# Patient Record
Sex: FEMALE | Race: OTHER
[De-identification: names, ages, dates, MRNs, and addresses within clinical notes are randomized per-mention and may not be internally consistent; named-entity substitution may affect disease eponyms.]

---

## 2018-05-25 ENCOUNTER — HOSPITAL ENCOUNTER (EMERGENCY)
Dept: HOSPITAL 72 - EMR | Age: 48
Discharge: HOME | End: 2018-05-25
Payer: MEDICAID

## 2018-05-25 VITALS — SYSTOLIC BLOOD PRESSURE: 117 MMHG | DIASTOLIC BLOOD PRESSURE: 84 MMHG

## 2018-05-25 VITALS — DIASTOLIC BLOOD PRESSURE: 78 MMHG | SYSTOLIC BLOOD PRESSURE: 124 MMHG

## 2018-05-25 VITALS — BODY MASS INDEX: 40.97 KG/M2 | WEIGHT: 240 LBS | HEIGHT: 64 IN

## 2018-05-25 DIAGNOSIS — K44.9: ICD-10-CM

## 2018-05-25 DIAGNOSIS — F45.8: Primary | ICD-10-CM

## 2018-05-25 LAB
ADD MANUAL DIFF: NO
ALBUMIN SERPL-MCNC: 4 G/DL (ref 3.4–5)
ALBUMIN/GLOB SERPL: 1.1 {RATIO} (ref 1–2.7)
ALP SERPL-CCNC: 82 U/L (ref 46–116)
ALT SERPL-CCNC: 25 U/L (ref 12–78)
ANION GAP SERPL CALC-SCNC: 7 MMOL/L (ref 5–15)
APPEARANCE UR: (no result)
APTT PPP: (no result) S
AST SERPL-CCNC: 15 U/L (ref 15–37)
BASOPHILS NFR BLD AUTO: 0.9 % (ref 0–2)
BILIRUB SERPL-MCNC: 0.4 MG/DL (ref 0.2–1)
BUN SERPL-MCNC: 9 MG/DL (ref 7–18)
CALCIUM SERPL-MCNC: 9.2 MG/DL (ref 8.5–10.1)
CHLORIDE SERPL-SCNC: 105 MMOL/L (ref 98–107)
CO2 SERPL-SCNC: 28 MMOL/L (ref 21–32)
CREAT SERPL-MCNC: 0.7 MG/DL (ref 0.55–1.3)
EOSINOPHIL NFR BLD AUTO: 1.2 % (ref 0–3)
ERYTHROCYTE [DISTWIDTH] IN BLOOD BY AUTOMATED COUNT: 11.2 % (ref 11.6–14.8)
GLOBULIN SER-MCNC: 3.8 G/DL
GLUCOSE UR STRIP-MCNC: NEGATIVE MG/DL
HCT VFR BLD CALC: 42.2 % (ref 37–47)
HGB BLD-MCNC: 14 G/DL (ref 12–16)
KETONES UR QL STRIP: NEGATIVE
LEUKOCYTE ESTERASE UR QL STRIP: NEGATIVE
LYMPHOCYTES NFR BLD AUTO: 23.6 % (ref 20–45)
MCV RBC AUTO: 86 FL (ref 80–99)
MONOCYTES NFR BLD AUTO: 6.4 % (ref 1–10)
NEUTROPHILS NFR BLD AUTO: 67.9 % (ref 45–75)
NITRITE UR QL STRIP: NEGATIVE
PH UR STRIP: 6 [PH] (ref 4.5–8)
PLATELET # BLD: 283 K/UL (ref 150–450)
POTASSIUM SERPL-SCNC: 4 MMOL/L (ref 3.5–5.1)
PROT UR QL STRIP: NEGATIVE
RBC # BLD AUTO: 4.9 M/UL (ref 4.2–5.4)
SODIUM SERPL-SCNC: 140 MMOL/L (ref 136–145)
SP GR UR STRIP: 1 (ref 1–1.03)
UROBILINOGEN UR-MCNC: NORMAL MG/DL (ref 0–1)
WBC # BLD AUTO: 8.8 K/UL (ref 4.8–10.8)

## 2018-05-25 PROCEDURE — 74220 X-RAY XM ESOPHAGUS 1CNTRST: CPT

## 2018-05-25 PROCEDURE — 70360 X-RAY EXAM OF NECK: CPT

## 2018-05-25 PROCEDURE — 99284 EMERGENCY DEPT VISIT MOD MDM: CPT

## 2018-05-25 PROCEDURE — 96375 TX/PRO/DX INJ NEW DRUG ADDON: CPT

## 2018-05-25 PROCEDURE — 93005 ELECTROCARDIOGRAM TRACING: CPT

## 2018-05-25 PROCEDURE — 83690 ASSAY OF LIPASE: CPT

## 2018-05-25 PROCEDURE — 85025 COMPLETE CBC W/AUTO DIFF WBC: CPT

## 2018-05-25 PROCEDURE — 96374 THER/PROPH/DIAG INJ IV PUSH: CPT

## 2018-05-25 PROCEDURE — 36415 COLL VENOUS BLD VENIPUNCTURE: CPT

## 2018-05-25 PROCEDURE — 80053 COMPREHEN METABOLIC PANEL: CPT

## 2018-05-25 PROCEDURE — 81003 URINALYSIS AUTO W/O SCOPE: CPT

## 2018-05-25 PROCEDURE — 70491 CT SOFT TISSUE NECK W/DYE: CPT

## 2018-05-25 NOTE — DIAGNOSTIC IMAGING REPORT
Indication: Dysphagia, pain, sensation of something being stuck in her neck after

eating meat last night

 

Technique: IV administration nonionic contrast. Spiral acquisitions obtained through

the neck. Multiplanar reconstructions were generated. Total dose length product

558.89 mGycm. CTDIvol(s) 18.96 mGy. Dose reduction achieved using automated exposure

control

 

 

Comparison: none

 

Findings: The upper aerodigestive tract is unremarkable. No definite evidence of

foreign body within the upper thoracic and cervical esophagus. No proximal esophageal

distention demonstrated.

 

There are prominent anterior triangle nodes, all with jugulodigastric nodes measuring

up to 21 mm long axis dimension on the right and 19 mm on the left, but no gerber

cervical mass or adenopathy demonstrated. No osseous abnormality. There is a mucous

retention cyst in the right maxillary sinus. Included thyroid is unremarkable. The

salivary glands are unremarkable. Included lung apices are clear. Included

intracranial structures are unremarkable.

 

Impression: No CT evidence of impacted esophageal foreign body or other findings to

explain stated clinical history of dysphagia. However, isoattenuating esophageal

foreign body cannot be completely excluded

 

No other evidence of acute or significant abnormality demonstrated

 

The CT scanner at [facility] is accredited by the American College of

Radiology and the scans are performed using protocols designed to limit radiation

exposure to as low as reasonably achievable to attain images of sufficient resolution

adequate for diagnostic evaluation.

## 2018-05-25 NOTE — EMERGENCY ROOM REPORT
History of Present Illness


General


Chief Complaint:  General Complaint


Source:  Patient


 (YANIQUEKYLIE MUNOZ D.O.)





Present Illness


HPI


This patient states that last night after eating a piece of meat she has had a 

sensation of something being stuck in her neck.  She states that it feels that 

it is stuck right about the junction of her neck and chest.  She denies 

difficulty with her secretions.  She did have 2 episodes of vomiting.  She is 

on Septra and drops for an otitis externa that her primary care physician 

prescribed her.  She denies fever or chills.  She denies chest pain or 

shortness of breath.  She denies abdominal pain.  She has no other complaints.


 (SARAMALACHIKYLIE MUNOZ D.O.)


Allergies:  


Coded Allergies:  


     No Known Allergies (Unverified , 9/14/16)





Patient History


Past Medical History:  none, see triage record


Social History:  Denies: smoking, alcohol use, drug use


Last Menstrual Period:  05/01/18


Reviewed Nursing Documentation:  PMH: Agreed; PSxH: Agreed (KYLIE CHANEL D.O.

)





Nursing Documentation-PMH


Past Medical History:  No Stated History


 (KYLIE CHANEL D.O.)





Review of Systems


All Other Systems:  negative except mentioned in HPI


 (KYLIE CHANEL D.O.)





Physical Exam





Vital Signs








  Date Time  Temp Pulse Resp B/P (MAP) Pulse Ox O2 Delivery O2 Flow Rate FiO2


 


5/25/18 09:22 98.1 85 18 117/84 94 Room Air  





 98.1       








Sp02 EP Interpretation:  reviewed, normal


General Appearance:  no apparent distress, alert, GCS 15, non-toxic


Head:  normocephalic, atraumatic


Eyes:  bilateral eye normal inspection, bilateral eye PERRL


ENT:  hearing grossly normal, normal pharynx, no angioedema, normal voice


Neck:  full range of motion, supple/symm/no masses


Respiratory:  chest non-tender, lungs clear, normal breath sounds, no 

respiratory distress, no retraction, no accessory muscle use, speaking full 

sentences


Cardiovascular #1:  regular rate, rhythm, no edema


Gastrointestinal:  normal bowel sounds, non tender, soft, non-distended, no 

guarding, no rebound


Rectal:  deferred


Musculoskeletal:  back normal, gait/station normal, normal range of motion, non-

tender


Neurologic:  alert, oriented x3, responsive, motor strength/tone normal, 

sensory intact, speech normal


Psychiatric:  judgement/insight normal, memory normal, mood/affect normal, no 

suicidal/homicidal ideation


Skin:  normal color, no rash, warm/dry, well hydrated


 (KYLIE CHANEL D.O.)





Medical Decision Making


Diagnostic Impression:  


 Primary Impression:  


 Globus sensation


 Additional Impression:  


 Possible esophageal food impaction


ER Course


Patient has a globus sensation after eating meat yesterday.  She did have 2 

episodes of vomiting.  Possibly this is some esophageal irritation versus an 

esophageal food bolus.  Another consideration is a pill esophagitis.  Soft 

tissue x-ray of the neck was negative.  I did give the patient IV glucagon and 

the patient tried drinking carbonated beverages without any relief in symptoms.

  The patient was maintaining her secretions and was without distress.  Given 

the persistent symptoms, I did obtain a CT of the neck which showed....


I felt this patient should be admitted for further investigation for an 

esophageal food impaction by gastroenterology.





Laboratory Tests








Test


  5/25/18


10:21 5/25/18


10:28


 


Urine Color Pale yellow   


 


Urine Appearance Turbid   


 


Urine pH 6 (4.5-8.0)   


 


Urine Specific Gravity


  1.005


(1.005-1.035) 


 


 


Urine Protein


  Negative


(NEGATIVE) 


 


 


Urine Glucose (UA)


  Negative


(NEGATIVE) 


 


 


Urine Ketones


  Negative


(NEGATIVE) 


 


 


Urine Occult Blood


  1+ (NEGATIVE)


H 


 


 


Urine Nitrite


  Negative


(NEGATIVE) 


 


 


Urine Bilirubin


  Negative


(NEGATIVE) 


 


 


Urine Urobilinogen


  Normal MG/DL


(0.0-1.0) 


 


 


Urine Leukocyte Esterase


  Negative


(NEGATIVE) 


 


 


Urine RBC


  0-2 /HPF (0 -


2) 


 


 


Urine WBC


  0-2 /HPF (0 -


2) 


 


 


Urine Squamous Epithelial


Cells Moderate /LPF


(NONE/OCC)  H 


 


 


Urine Bacteria


  Few /HPF


(NONE) 


 


 


White Blood Count


  


  8.8 K/UL


(4.8-10.8)


 


Red Blood Count


  


  4.90 M/UL


(4.20-5.40)


 


Hemoglobin


  


  14.0 G/DL


(12.0-16.0)


 


Hematocrit


  


  42.2 %


(37.0-47.0)


 


Mean Corpuscular Volume  86 FL (80-99)  


 


Mean Corpuscular Hemoglobin


  


  28.6 PG


(27.0-31.0)


 


Mean Corpuscular Hemoglobin


Concent 


  33.3 G/DL


(32.0-36.0)


 


Red Cell Distribution Width


  


  11.2 %


(11.6-14.8)  L


 


Platelet Count


  


  283 K/UL


(150-450)


 


Mean Platelet Volume


  


  5.5 FL


(6.5-10.1)  L


 


Neutrophils (%) (Auto)


  


  67.9 %


(45.0-75.0)


 


Lymphocytes (%) (Auto)


  


  23.6 %


(20.0-45.0)


 


Monocytes (%) (Auto)


  


  6.4 %


(1.0-10.0)


 


Eosinophils (%) (Auto)


  


  1.2 %


(0.0-3.0)


 


Basophils (%) (Auto)


  


  0.9 %


(0.0-2.0)


 


Sodium Level


  


  140 MMOL/L


(136-145)


 


Potassium Level


  


  4.0 MMOL/L


(3.5-5.1)


 


Chloride Level


  


  105 MMOL/L


()


 


Carbon Dioxide Level


  


  28 MMOL/L


(21-32)


 


Anion Gap


  


  7 mmol/L


(5-15)


 


Blood Urea Nitrogen


  


  9 mg/dL (7-18)


 


 


Creatinine


  


  0.7 MG/DL


(0.55-1.30)


 


Estimate Glomerular


Filtration Rate 


  > 60 mL/min


(>60)


 


Glucose Level


  


  114 MG/DL


()  H


 


Calcium Level


  


  9.2 MG/DL


(8.5-10.1)


 


Total Bilirubin


  


  0.4 MG/DL


(0.2-1.0)


 


Aspartate Amino Transferase


(AST) 


  15 U/L (15-37)


 


 


Alanine Aminotransferase (ALT)


  


  25 U/L (12-78)


 


 


Alkaline Phosphatase


  


  82 U/L


()


 


Total Protein


  


  7.8 G/DL


(6.4-8.2)


 


Albumin


  


  4.0 G/DL


(3.4-5.0)


 


Globulin  3.8 g/dL  


 


Albumin/Globulin Ratio  1.1 (1.0-2.7)  


 


Lipase


  


  65 U/L


()  L








 (KYLIE CHANEL D.O.)


ER Course


Patient was discussed with Dr. Garcia who agreed to see the patient.  The 

patient is discussed with Dr. Jesse Peña due to capitated physician.The CT 

imaging was previously performed and showed no evidence of esophageal foreign 

body.  Patient was discussed with Dr. Garcia who requested an esophagram.  

The esophagram read by radiology showed no evidence of foreign body or 

obstruction    The patient was noted to have normal ability to swallow and 

shows no evidence of airway compromise.  Patient was given lidocaine and Maalox 

with improvement after discussion with Dr. Garcia.  The patient subsequently 

was offered transfer for further evaluation with ENT.  The patient declined 

transfer and stated she wanted to go home and would follow-up if symptoms 

worsened





Labs








Test


  5/25/18


10:21 5/25/18


10:28


 


Urine Color Pale yellow  


 


Urine Appearance Turbid  


 


Urine pH 6 (4.5-8.0)  


 


Urine Specific Gravity


  1.005


(1.005-1.035) 


 


 


Urine Protein


  Negative


(NEGATIVE) 


 


 


Urine Glucose (UA)


  Negative


(NEGATIVE) 


 


 


Urine Ketones


  Negative


(NEGATIVE) 


 


 


Urine Occult Blood 1+ (NEGATIVE)  


 


Urine Nitrite


  Negative


(NEGATIVE) 


 


 


Urine Bilirubin


  Negative


(NEGATIVE) 


 


 


Urine Urobilinogen


  Normal MG/DL


(0.0-1.0) 


 


 


Urine Leukocyte Esterase


  Negative


(NEGATIVE) 


 


 


Urine RBC


  0-2 /HPF (0 -


2) 


 


 


Urine WBC


  0-2 /HPF (0 -


2) 


 


 


Urine Squamous Epithelial


Cells Moderate /LPF


(NONE/OCC) 


 


 


Urine Bacteria


  Few /HPF


(NONE) 


 


 


White Blood Count


  


  8.8 K/UL


(4.8-10.8)


 


Red Blood Count


  


  4.90 M/UL


(4.20-5.40)


 


Hemoglobin


  


  14.0 G/DL


(12.0-16.0)


 


Hematocrit


  


  42.2 %


(37.0-47.0)


 


Mean Corpuscular Volume  86 FL (80-99) 


 


Mean Corpuscular Hemoglobin


  


  28.6 PG


(27.0-31.0)


 


Mean Corpuscular Hemoglobin


Concent 


  33.3 G/DL


(32.0-36.0)


 


Red Cell Distribution Width


  


  11.2 %


(11.6-14.8)


 


Platelet Count


  


  283 K/UL


(150-450)


 


Mean Platelet Volume


  


  5.5 FL


(6.5-10.1)


 


Neutrophils (%) (Auto)


  


  67.9 %


(45.0-75.0)


 


Lymphocytes (%) (Auto)


  


  23.6 %


(20.0-45.0)


 


Monocytes (%) (Auto)


  


  6.4 %


(1.0-10.0)


 


Eosinophils (%) (Auto)


  


  1.2 %


(0.0-3.0)


 


Basophils (%) (Auto)


  


  0.9 %


(0.0-2.0)


 


Sodium Level


  


  140 MMOL/L


(136-145)


 


Potassium Level


  


  4.0 MMOL/L


(3.5-5.1)


 


Chloride Level


  


  105 MMOL/L


()


 


Carbon Dioxide Level


  


  28 MMOL/L


(21-32)


 


Anion Gap


  


  7 mmol/L


(5-15)


 


Blood Urea Nitrogen  9 mg/dL (7-18) 


 


Creatinine


  


  0.7 MG/DL


(0.55-1.30)


 


Estimat Glomerular Filtration


Rate 


  > 60 mL/min


(>60)


 


Glucose Level


  


  114 MG/DL


()


 


Calcium Level


  


  9.2 MG/DL


(8.5-10.1)


 


Total Bilirubin


  


  0.4 MG/DL


(0.2-1.0)


 


Aspartate Amino Transf


(AST/SGOT) 


  15 U/L (15-37) 


 


 


Alanine Aminotransferase


(ALT/SGPT) 


  25 U/L (12-78) 


 


 


Alkaline Phosphatase


  


  82 U/L


()


 


Total Protein


  


  7.8 G/DL


(6.4-8.2)


 


Albumin


  


  4.0 G/DL


(3.4-5.0)


 


Globulin  3.8 g/dL 


 


Albumin/Globulin Ratio  1.1 (1.0-2.7) 


 


Lipase


  


  65 U/L


()








 (Kelvin Downs MD)





Last Vital Signs








  Date Time  Temp Pulse Resp B/P (MAP) Pulse Ox O2 Delivery O2 Flow Rate FiO2


 


5/25/18 09:35 98.1 82 18 117/84 94 Room Air  





 98.1       








 (KYLIE CHANEL D.O.)


Status:  improved


 (Kelvin Downs MD)


Disposition:  HOME, SELF-CARE


Condition:  Stable


Scripts


Omeprazole Magnesium (PRILOSEC OTC) 20 Mg Tablet.dr


20 MG ORAL DAILY, #30 TAB


   Prov: Kelvin Downs MD         5/25/18 


Lidocaine HCl (Lidocaine HCl Viscous) 100 Ml Solution


20 MG MM EVERY 4 HOURS, #120 MG


   Prov: Kelvin Downs MD         5/25/18


Referrals:  


NON PHYSICIAN (PCP)











KYLIE CHANEL D.O. May 25, 2018 13:29


Kelvin Downs MD May 25, 2018 16:07

## 2018-05-25 NOTE — DIAGNOSTIC IMAGING REPORT
Indication: Sore throat times one day after eating meat, suspected esophageal foreign

body

 

Technique: The patient ingested effervescent granules, oral thick and thin liquid

barium, and rapid sequence spot images were obtained in multiple projections.

 

Total fluoroscopy time 0.7 minutes.

Total dose area product  320 dGycm2

 

Comparison: none

 

Findings: On the lateral view, the epiglottis appears somewhat thick, although this

is only the case in the neutral position.. No intraluminal filling defects

demonstrated. No obstruction to downward propulsion of contrast. There is minimal

distal dysmotility. On the prone images, there is a small sliding-type hiatal hernia

 

Impression: No evidence of esophageal foreign body or esophageal impaction or

obstruction

 

Equivocal mild thickening of the epiglottis, significance uncertain if real

 

Mild distal esophageal dysmotility

 

Small siding type hiatal hernia

## 2018-05-25 NOTE — DIAGNOSTIC IMAGING REPORT
Indication: Reason For Exam: PAIN

 

Technique: 2 views of the neck with soft tissue technique

 

Comparison: none

 

Findings: The bones are unremarkable. No prevertebral soft tissue swelling. No

radiopaque foreign body demonstrated. No epiglottic enlargement. No glottic narrowing

 

Impression: Negative I have reviewed and confirmed nurses' notes for patient's medications, allergies, medical history, and surgical history.

## 2018-05-26 NOTE — CONSULTATION
DATE OF CONSULTATION:  2018



GASTROENTEROLOGY CONSULTATION



CONSULTING PHYSICIAN:  Monae Garcia M.D.



CHIEF COMPLAINT:  I was asked to see this patient by Dr. Jesse Peña for

evaluation of her throat symptoms.



HISTORY OF PRESENT ILLNESS:  The patient is a 47-year-old [place] woman,

whom I was asked to see on an emergency basis due to her emergency

complaints in the emergency room.  The patient was at a party last night

and had some type of a meat, which got stuck in her throat for

approximately 20 minutes.  She had some hiccups and then eventually was

able to pass the meat down.  However since then, she has had a sensation

of foreign body or dysphagia in the neck area.  She tried eating multiple

foods such as water and lemon juice and even a hard piece of bread to make

the perceived foreign body to pass through.  However, she continued to

have the symptoms.  She came to the emergency room for further evaluation.

This is the first time she has had this problem.  She has had no nausea,

vomiting, and no history of gastroesophageal reflux.  She has had a recent

ear infection, for which she took some Bactrim for about three days twice

daily.  The patient does not smoke.



PAST MEDICAL HISTORY:  Otherwise negative.



PAST SURGICAL HISTORY:  Status post  section.



ALLERGIES:  None.



FAMILY HISTORY:  Noncontributory.



SOCIAL HISTORY:  She does not smoke or drink alcohol.



REVIEW OF SYSTEMS:  Otherwise negative.



PHYSICAL EXAMINATION:

GENERAL:  Obese [place] woman, seen in the emergency room.

HEENT:  Normocephalic and atraumatic.  Sclerae are anicteric.  Oropharynx

clear.

NECK:  Supple.  There is no lymphadenopathy.

CHEST:  Clear to auscultation.

CARDIOVASCULAR:  Regular rate.

ABDOMEN:  Soft.

EXTREMITIES:  Revealed no edema.



DIAGNOSTIC DATA:  CT scan and esophagram were noted.



ASSESSMENT:  This patient has a sensation of foreign body like sensation in

her throat since she had a meat impaction last night.  It is likely that

the meat impaction caused some injury to the laryngeal area and in fact

the epiglottis according to the imaging studies looks possibly somewhat

inflamed.  As such, these relatively minor injuries to the area can

continue to cause a foreign body sensation until they heal.  There is no

evidence of obstruction on either neck CT or esophagram and in fact the

patient can eat and drink.  However, the persistent sensation of dysphagia

bothers her.  I advised that she should wait a few days and if the process

does not heal by itself, then either a laryngoscopy or endoscopy or both

can be done to evaluate both the upper laryngeal area and the upper

esophageal area.  An ENT consultation will be worthwhile in this regard.

For the time being, however, it appears that a period of waiting for

recovery will be appropriate.  This can be done as an inpatient if the

patient is unable to hydrate herself or as an outpatient if the patient is

able to drink adequate liquids to maintain hydration.  Dose of viscous

lidocaine mixed with Mylanta may help with her swallow.



RECOMMENDATIONS:  Per above discussion and per discussion with emergency

room physician.



Thank you for asking me to participate in the care of this patient.









  ______________________________________________

  Monae Garcia M.D.





DR:  CELSO

D:  2018 17:47

T:  2018 00:05

JOB#:  [id number]

CC:



DYLAN

## 2018-05-28 NOTE — CARDIOLOGY REPORT
--------------- APPROVED REPORT --------------





EKG Measurement

Heart [id number]

AZ [id number]

[id number]

[id number]

[id number]





Normal sinus rhythm

Low voltage QRS

Borderline ECG

## 2018-05-29 NOTE — CONSULTATION
Consult Note


Consult Note


HISTORY AND PHYSICAL





HISTORY OF PRESENT ILLNESS:  The patient is a 47-year-old [place] woman,


whom I was asked to see on an emergency basis due to her emergency


complaints in the emergency room.  The patient was at a party last night


and had some type of a meat, which got stuck in her throat for


approximately 20 minutes.  She had some hiccups and then eventually was


able to pass the meat down.  However since then, she has had a sensation


of foreign body or dysphagia in the neck area.  She tried eating multiple


foods such as water and lemon juice and even a hard piece of bread to make


the perceived foreign body to pass through.  However, she continued to


have the symptoms.  She came to the emergency room for further evaluation.


This is the first time she has had this problem.  She has had no nausea,


vomiting, and no history of gastroesophageal reflux.  She has had a recent


ear infection, for which she took some Bactrim for about three days twice


daily.  The patient does not smoke.





PAST MEDICAL HISTORY:  Otherwise negative.





PAST SURGICAL HISTORY:  Status post  section.





ALLERGIES:  None.





FAMILY HISTORY:  Noncontributory.





SOCIAL HISTORY:  She does not smoke or drink alcohol.





REVIEW OF SYSTEMS:  Otherwise negative.





PHYSICAL EXAMINATION:


GENERAL:  Obese [place] woman, seen in the emergency room.


HEENT:  Normocephalic and atraumatic.  Sclerae are anicteric.  Oropharynx


clear.


NECK:  Supple.  There is no lymphadenopathy.


CHEST:  Clear to auscultation.


CARDIOVASCULAR:  Regular rate.


ABDOMEN:  Soft.


EXTREMITIES:  Revealed no edema.





DIAGNOSTIC DATA:  CT scan and esophagram were noted.





ASSESSMENT:  This patient has a sensation of foreign body like sensation in


her throat since she had a meat impaction last night.  It is likely that


the meat impaction caused some injury to the laryngeal area and in fact


the epiglottis according to the imaging studies looks possibly somewhat


inflamed.  As such, these relatively minor injuries to the area can


continue to cause a foreign body sensation until they heal.  There is no


evidence of obstruction on either neck CT or esophagram and in fact the


patient can eat and drink.  However, the persistent sensation of dysphagia


bothers her.  





An ENT consultation will be worthwhile in this regard.


For the time being, however, it appears that a period of waiting for


recovery will be appropriate.  This can be done as an inpatient if the


patient is unable to hydrate herself or as an outpatient if the patient is


able to drink adequate liquids to maintain hydration.  Dose of viscous


lidocaine mixed with Mylanta may help with her swallow.





Discussed with ER physician and GI specialist.  Likely discharge home versus 

transfer to contracted hospital.








  ______________________________________________


Jesse Peña M.D.











Jesse Peña MD May 29, 2018 16:04

## 2018-06-02 ENCOUNTER — HOSPITAL ENCOUNTER (EMERGENCY)
Dept: HOSPITAL 72 - EMR | Age: 48
Discharge: HOME | End: 2018-06-02
Payer: MEDICAID

## 2018-06-02 VITALS — DIASTOLIC BLOOD PRESSURE: 61 MMHG | SYSTOLIC BLOOD PRESSURE: 112 MMHG

## 2018-06-02 VITALS — BODY MASS INDEX: 40.97 KG/M2 | HEIGHT: 64 IN | WEIGHT: 240 LBS

## 2018-06-02 VITALS — SYSTOLIC BLOOD PRESSURE: 112 MMHG | DIASTOLIC BLOOD PRESSURE: 61 MMHG

## 2018-06-02 DIAGNOSIS — K52.9: Primary | ICD-10-CM

## 2018-06-02 LAB
ADD MANUAL DIFF: NO
ALBUMIN SERPL-MCNC: 3.9 G/DL (ref 3.4–5)
ALBUMIN/GLOB SERPL: 0.9 {RATIO} (ref 1–2.7)
ALP SERPL-CCNC: 78 U/L (ref 46–116)
ALT SERPL-CCNC: 32 U/L (ref 12–78)
ANION GAP SERPL CALC-SCNC: 11 MMOL/L (ref 5–15)
APPEARANCE UR: CLEAR
APTT PPP: (no result) S
AST SERPL-CCNC: 28 U/L (ref 15–37)
BASOPHILS NFR BLD AUTO: 0.8 % (ref 0–2)
BILIRUB SERPL-MCNC: 0.5 MG/DL (ref 0.2–1)
BUN SERPL-MCNC: 19 MG/DL (ref 7–18)
CALCIUM SERPL-MCNC: 8.8 MG/DL (ref 8.5–10.1)
CHLORIDE SERPL-SCNC: 105 MMOL/L (ref 98–107)
CO2 SERPL-SCNC: 24 MMOL/L (ref 21–32)
CREAT SERPL-MCNC: 0.7 MG/DL (ref 0.55–1.3)
EOSINOPHIL NFR BLD AUTO: 0.6 % (ref 0–3)
ERYTHROCYTE [DISTWIDTH] IN BLOOD BY AUTOMATED COUNT: 11.3 % (ref 11.6–14.8)
GLOBULIN SER-MCNC: 4.2 G/DL
GLUCOSE UR STRIP-MCNC: NEGATIVE MG/DL
HCT VFR BLD CALC: 44.5 % (ref 37–47)
HGB BLD-MCNC: 15.6 G/DL (ref 12–16)
KETONES UR QL STRIP: NEGATIVE
LEUKOCYTE ESTERASE UR QL STRIP: (no result)
LYMPHOCYTES NFR BLD AUTO: 10.9 % (ref 20–45)
MCV RBC AUTO: 85 FL (ref 80–99)
MONOCYTES NFR BLD AUTO: 5.9 % (ref 1–10)
NEUTROPHILS NFR BLD AUTO: 81.9 % (ref 45–75)
NITRITE UR QL STRIP: NEGATIVE
PH UR STRIP: 7 [PH] (ref 4.5–8)
PLATELET # BLD: 276 K/UL (ref 150–450)
POTASSIUM SERPL-SCNC: 4.5 MMOL/L (ref 3.5–5.1)
PROT UR QL STRIP: NEGATIVE
RBC # BLD AUTO: 5.26 M/UL (ref 4.2–5.4)
SODIUM SERPL-SCNC: 139 MMOL/L (ref 136–145)
SP GR UR STRIP: 1.01 (ref 1–1.03)
UROBILINOGEN UR-MCNC: NORMAL MG/DL (ref 0–1)
WBC # BLD AUTO: 10.8 K/UL (ref 4.8–10.8)

## 2018-06-02 PROCEDURE — 83690 ASSAY OF LIPASE: CPT

## 2018-06-02 PROCEDURE — 74176 CT ABD & PELVIS W/O CONTRAST: CPT

## 2018-06-02 PROCEDURE — 81025 URINE PREGNANCY TEST: CPT

## 2018-06-02 PROCEDURE — 85025 COMPLETE CBC W/AUTO DIFF WBC: CPT

## 2018-06-02 PROCEDURE — 36415 COLL VENOUS BLD VENIPUNCTURE: CPT

## 2018-06-02 PROCEDURE — 96375 TX/PRO/DX INJ NEW DRUG ADDON: CPT

## 2018-06-02 PROCEDURE — 99284 EMERGENCY DEPT VISIT MOD MDM: CPT

## 2018-06-02 PROCEDURE — 81003 URINALYSIS AUTO W/O SCOPE: CPT

## 2018-06-02 PROCEDURE — 96374 THER/PROPH/DIAG INJ IV PUSH: CPT

## 2018-06-02 PROCEDURE — 80053 COMPREHEN METABOLIC PANEL: CPT

## 2018-06-02 NOTE — EMERGENCY ROOM REPORT
History of Present Illness


General


Chief Complaint:  Abdominal Pain


Source:  Patient, Family Member





Present Illness


HPI


Is a 47-year-old female with no cerumen past medical history.  She presents 

with chief complaint abdominal pain with vomiting and diarrhea.  Onset was 

about few hours ago.  She thinks it may be food poisoning.  She was at a 

restaurant and then 2 hour later started having fever chills abdominal pain and 

then diarrhea.  Diarrhea is watery and profuse.  And she started having 

vomiting.  Pain is crampy in nature mostly epigastric area.  10 out of 10.  No 

chest pain.  No cough or congestion.


Allergies:  


Coded Allergies:  


     No Known Allergies (Unverified , 9/14/16)





Patient History


Past Medical History:  see triage record, old chart reviewed


Past Surgical History:  other


Pertinent Family History:  none


Social History:  Denies: smoking


Pregnant Now:  No


Immunizations:  other


Reviewed Nursing Documentation:  PMH: Agreed; PSxH: Agreed





Nursing Documentation-PMH


Past Medical History:  No Stated History





Review of Systems


Constitutional:  Reports: chills, fever


Eye:  Denies: eye pain, blurred vision


ENT:  Denies: ear pain, nose congestion, throat swelling


Respiratory:  Denies: cough, shortness of breath


Cardiovascular:  Denies: chest pain, palpitations


Gastrointestinal:  Reports: abdominal pain, diarrhea, nausea, vomiting


Musculoskeletal:  Denies: back pain, joint pain


Skin:  Denies: rash


Neurological:  Denies: headache, numbness


Endocrine:  Denies: increased thirst, increased urine


Hematologic/Lymphatic:  Denies: easy bruising


All Other Systems:  negative except mentioned in HPI





Physical Exam





Vital Signs








  Date Time  Temp Pulse Resp B/P (MAP) Pulse Ox O2 Delivery O2 Flow Rate FiO2


 


6/2/18 21:56 99.5 128 21 118/78 97 Room Air  





 99.5       





vitals with low-grade fever and tachycardia


Sp02 EP Interpretation:  reviewed, normal


General Appearance:  well appearing, no apparent distress, alert


Head:  normocephalic, atraumatic


Eyes:  bilateral eye PERRL, bilateral eye EOMI


ENT:  hearing grossly normal, normal pharynx


Neck:  full range of motion, supple, no meningismus


Respiratory:  chest non-tender, lungs clear, normal breath sounds


Cardiovascular #1:  regular rate, rhythm, no murmur


Gastrointestinal:  no mass, no organomegaly, no bruit, non-distended, 

tenderness - diffuse, decreased bowel sounds


Musculoskeletal:  back normal, gait/station normal, normal range of motion


Neurologic:  alert, oriented x3


Psychiatric:  mood/affect normal


Skin:  warm/dry





Medical Decision Making


Diagnostic Impression:  


 Primary Impression:  


 Abdominal pain


 Qualified Codes:  R10.84 - Generalized abdominal pain


 Additional Impression:  


 Gastroenteritis


ER Course


Patient presents with abdominal pain fever and vomiting and diarrhea.  Symptom 

consistent with a gastroenteritis his been [place] the community.  No 

evidence of acute abdomen.  No evidence of obstruction appendicitis.  Better 

now.  We'll discharge home.  Labs unremarkable.  CT scan unremarkable.


Lab Results Impression


labs normal


CT/MRI/US Diagnostic Results


CT/MRI/US Diagnostic Results :  


   Imaging Test Ordered:  CT abdomen and pelvis


   Impression


read by radiologist.  Fluid levels in the intestines. no obstruction





Last Vital Signs








  Date Time  Temp Pulse Resp B/P (MAP) Pulse Ox O2 Delivery O2 Flow Rate FiO2


 


6/2/18 21:56 99.5 128 21 118/78 97 Room Air  





 99.5       








Status:  improved


Disposition:  HOME, SELF-CARE


Condition:  Stable


Scripts


Ondansetron (Zofran) 4 Mg Tablet


4 MG ORAL Q6H PRN for Nausea & Vomiting, #10 TAB 0 Refills


   Prov: DIVINE BOLIVAR M.D.         6/2/18 


Ibuprofen* (MOTRIN*) 600 Mg Tablet


600 MG ORAL THREE TIMES A DAY, #30 TAB 0 Refills


   Prov: DIVINE BOLIVAR M.D.         6/2/18


Referrals:  


[facility],REFERRING (PCP)





Additional Instructions:  


Follow-up with your doctor in 7 days.  Return if symptom worsen.











DIVINE BOLIVAR M.D. Jun 2, 2018 22:12

## 2018-06-02 NOTE — DIAGNOSTIC IMAGING REPORT
EXAM:

  CT Abdomen and Pelvis Without Intravenous Contrast

 

CLINICAL HISTORY:

  ABD PAIN

 

TECHNIQUE:

  Axial computed tomography images of the abdomen and pelvis without 

intravenous contrast.  One or more of the following dose reduction 

techniques were used: automated exposure control, adjustment of the mA 

and/or kV according to patient size, use of iterative reconstruction 

technique.

 

COMPARISON:

  No relevant prior studies available.

 

FINDINGS:

  Lung bases:  No acute or suspicious findings.  No mass.  No 

consolidation.

 

 ABDOMEN:

  Liver:  No acute or suspicious findings.

  Gallbladder and bile ducts:  No acute or suspicious findings.  No 

calcified stones.  No ductal dilation.

  Pancreas:  No acute or suspicious findings.  No ductal dilation.

  Spleen:  No acute or suspicious findings.  No splenomegaly.

  Adrenals:  No acute or suspicious findings.  No mass.

  Kidneys and ureters:  No acute or suspicious findings.  No obstructing 

stones.  No hydronephrosis.

  Stomach and bowel:  Fluid levels in the small and large bowel without 

obstruction or wall thickening.

 

 PELVIS:

  Appendix:  No findings to suggest acute appendicitis.

  Bladder:  No acute or suspicious findings.  No stones.

  Reproductive:  Unremarkable as visualized.

 

 ABDOMEN and PELVIS:

  Intraperitoneal space:  No acute or suspicious findings.  No free air.  

No significant fluid collection.

  Bones/joints:  No acute fracture.  No dislocation.

  Soft tissues:  No acute or suspicious findings.

  Vasculature:  No acute or suspicious findings.  No abdominal aortic 

aneurysm.

  Lymph nodes:  No acute or suspicious findings.  No enlarged lymph nodes.

 

IMPRESSION:     

  Fluid levels in the small and large bowel without obstruction or wall 

thickening.

## 2018-11-06 ENCOUNTER — HOSPITAL ENCOUNTER (EMERGENCY)
Dept: HOSPITAL 72 - EMR | Age: 48
Discharge: HOME | End: 2018-11-06
Payer: MEDICAID

## 2018-11-06 VITALS — SYSTOLIC BLOOD PRESSURE: 122 MMHG | DIASTOLIC BLOOD PRESSURE: 86 MMHG

## 2018-11-06 VITALS — HEIGHT: 64 IN | WEIGHT: 205 LBS | BODY MASS INDEX: 35 KG/M2

## 2018-11-06 DIAGNOSIS — H57.11: Primary | ICD-10-CM

## 2018-11-06 PROCEDURE — 99283 EMERGENCY DEPT VISIT LOW MDM: CPT

## 2018-11-06 RX ADMIN — TETRACAINE HYDROCHLORIDE ONE DROP: 5 SOLUTION OPHTHALMIC at 20:28

## 2018-11-06 RX ADMIN — TETRACAINE HYDROCHLORIDE ONE DROP: 5 SOLUTION OPHTHALMIC at 20:29

## 2018-11-06 NOTE — EMERGENCY ROOM REPORT
History of Present Illness


General


Chief Complaint:  Eye Problems


Source:  Patient





Present Illness


HPI


48 year old female presents to the emergency department complaining of 8 out of 

10 in severity pain that has been progressive in the right eye since last 

night.  Patient also reports headache that she states was moderate 2 days prior 

to her symptoms.  Patient reports blurry vision in the affected eyes she denies 

eye erythema or discharge.  Patient does report some mild congestion in the 

right side of the nose.  Patient denies foreign body sensation or increased 

lacrimation.  Patient states she attempted to use artificial tears however she 

felt that it made her pain worse.  Patient denies loss of vision.  Denies: 

Corrective lens use, Floaters, Flashing lights, or photophobia.  Patient 

reports that it feels as though the pain is coming from inside of her eye.  

Patient denies history of glaucoma.  She denies fevers, chills or recent trauma 

to the eye.  Patient denies swelling or erythema to the soft tissues around the 

eye.  She also denies pain with eye movements.


Allergies:  


Coded Allergies:  


     No Known Allergies (Unverified , 9/14/16)





Patient History


Past Medical History:  see triage record


Past Surgical History:  none


Pertinent Family History:  none


Last Menstrual Period:  n/a


Reviewed Nursing Documentation:  PMH: Agreed; PSxH: Agreed





Nursing Documentation-PMH


Past Medical History:  No Stated History





Review of Systems


All Other Systems:  negative except mentioned in HPI





Physical Exam





Vital Signs








  Date Time  Temp Pulse Resp B/P (MAP) Pulse Ox O2 Delivery O2 Flow Rate FiO2


 


11/6/18 19:55 98.4 88 16 122/86 93   








Eyes:  right eye visual acuity - 20/100; bilateral eye normal inspection, 

bilateral eye PERRL, bilateral eye EOMI, bilateral eye other - no photophobia, 

mild swelling noted in the upper right lid, no fb, chalazion or hordeoleum on 

lid flip or inspection.


ENT:  normal pharynx, normal voice, TMs + canals normal, nasal congestion - 

right side


Neck:  no meningismus, no bony tend


Respiratory:  lungs clear


Cardiovascular #1:  regular rate, rhythm





Medical Decision Making


PA Attestation


Dr. Downs  is my supervising Physician whom patient management has been 

discussed with.


Diagnostic Impression:  


 Primary Impression:  


 Acute eye pain


 Additional Impression:  


 Pain, eye, right


ER Course


48 year old female presents to the emergency department complaining of 8 out of 

10 in severity pain that has been progressive in the right eye since last 

night.  Patient also reports headache that she states was moderate 2 days prior 

to her symptoms.  Patient reports blurry vision in the affected eyes she denies 

eye erythema or discharge.  Patient does report some mild congestion in the 

right side of the nose.  Patient denies foreign body sensation or increased 

lacrimation.  Patient states she attempted to use artificial tears however she 

felt that it made her pain worse.  Patient denies loss of vision.  Denies: 

Corrective lens use, Floaters, Flashing lights, or photophobia.  Patient 

reports that it feels as though the pain is coming from inside of her eye.  

Patient denies history of glaucoma.  She denies fevers, chills or recent trauma 

to the eye.  Patient denies swelling or erythema to the soft tissues around the 

eye.  She also denies pain with eye movements.


 


Ddx considered but are not limited to: corneal abrasion, acute glaucoma, globe 

rupture, FB, Corneal Ulcer, conjunctivitis. Iridis, atypical HA/migraine 


Vital signs: are WNL, pt. is afebrile 


H&PE are most consistent with: corneal abrasion 


ORDERS: 


- IOP 17


-Tetracaine and Fluorescein Stain of the Right eye: 


- No Increase fluorescein uptake.  Negative Matilde sign. 


Pt. DID NOT HAVE   relief of pain with tetracaine drops. 


There was negative evidence of Fb, deep ulcer, or rupture.


 


ED INTERVENTIONS: 


-Tylenol PO


-Reglan PO


-Afrin Nasally





Physician consult with ophthalmologist Dr. Cherry whom also examined the pt. and 

performed slit lamp examination. He is recommending artificial tears and will 

have patient follow-up at his office. 





DISCHARGE: At this time pt. is stable for d/c to home. Will provide printed 

patient care instructions, and any necessary prescriptions. Care plan and 

follow up instructions have been discussed with the patient prior to discharge. 

.





Last Vital Signs








  Date Time  Temp Pulse Resp B/P (MAP) Pulse Ox O2 Delivery O2 Flow Rate FiO2


 


11/6/18 20:00 98.4 78 16 122/86 93   








Disposition:  HOME, SELF-CARE


Condition:  Stable


Physician Consult:  Dr. Boateng


Scripts


Dextran 70/Hypromellose (ARTIFICIAL TEARS EYE DROPS*) 15 Ml Drops


1 DROP RIGHT EYE DAILY, #15 ML 0 Refills


   Prov: Kelvin Downs MD         11/6/18 


Ibuprofen* (MOTRIN*) 600 Mg Tablet


600 MG ORAL Q6H PRN for For Pain, #20 TAB


   Prov: Haleigh Carlton         11/6/18 


Cephalexin* (KEFLEX*) 500 Mg Capsule


500 MG ORAL EVERY 12 HOURS for 7 Days, #14 CAP 0 Refills


   Prov: Haleigh Carlton         11/6/18


Referrals:  


NON PHYSICIAN (PCP)











Haleigh Carlton Nov 6, 2018 20:33

## 2018-11-06 NOTE — CONSULTATION
Consult Note


Consult Note


Ophthalmology Consultation








Referring Physician: Amado Downs





Reason for Consultation: blurred vision 





History of the present illness: The patient is a 48-year-old woman who reports 

a 1 day history of pain and blurred vision in her right eye. She denies any 

prior similar history. She denies trauma. 





Past medical Hx:


none





Medications:


none





Allergies:


NKDA





Family History:


non-contributory





Social History:


no tobacco





Review of Systems:


General: no fever


HEENT: see HPI


Cardiac: no chest pain


Respiratory: no shortness of breath


GI: no nausea


: no urinary complaints


MS: no joint pains


Derm: no rashes


Psychiatric: no depression


Neurologic: no numbness, no weakness


Heme: no easy bruising


 


Examination:


Mini-mental status examination revealed the patient to be awake, alert and 

oriented to person, place and time with appropriate mood and affect.





Visual Acuity: 


OD: 20/100 -> no improvement with pinhole


OS: 20/40





Intraocular pressure: 


OD: 12 mmHg


OS: 14 mmHg





Pupils: equal, round and reactive to light, without afferent pupillary defect 

in either eye


Extra-ocular motility: full OU


Confrontational visual fields: full to finger counting OU





Anterior Segments:


External: normal lids and orbits OU


Conjunctivae: white and quiet OU


Cornea: diffuse punctate epithelial erosions OD>OS


Anterior Chambers: Deep and Quiet OU


Irides: Round and flat OU


Lenses: Clear OU





Dilated Fundus Examination:


Vitreous: Clear OU


Optic Nerves: Sharp OU


Vessels: Normal course and caliber OU


Maculae: Flat OU


Periphery: normal OU








lid everted, no foreign bodies seen


 .


Assessment/Plan


.


Impression:


1. Dry Eye OD>OS





Assessment and Plan:


This is a 48-year-old woman who presented to the ER with complaints of pain and 

blurred vision OD for 1 day. She was found to have moderate punctate corneal 

staining OD and her pain improved substantially with instillation of 

proparacaine drops. She did have moderate globe tenderness which raised the 

concern for possible scleritis though the sclera appears quiet on exam and the 

tenderness improved substantially with instillation of proparacaine drops. The 

patient also noted visual improvement after dilation. Frequent artificial tears 

are recommended as well as ibuprofen for pain. The patient was encouraged to 

have ophthalmology follow-up this week and she was given my office contact 

information to arrange this.








Thank you very much for this consultation


Major Cherry M.D.


[phone]











Major Cherry MD Nov 6, 2018 22:07

## 2019-09-13 ENCOUNTER — HOSPITAL ENCOUNTER (EMERGENCY)
Dept: HOSPITAL 72 - EMR | Age: 49
Discharge: HOME | End: 2019-09-13
Payer: MEDICAID

## 2019-09-13 VITALS — DIASTOLIC BLOOD PRESSURE: 81 MMHG | SYSTOLIC BLOOD PRESSURE: 113 MMHG

## 2019-09-13 VITALS — BODY MASS INDEX: 41.32 KG/M2 | HEIGHT: 64 IN | WEIGHT: 242 LBS

## 2019-09-13 DIAGNOSIS — R21: Primary | ICD-10-CM

## 2019-09-13 PROCEDURE — 99282 EMERGENCY DEPT VISIT SF MDM: CPT

## 2019-09-13 NOTE — EMERGENCY ROOM REPORT
History of Present Illness


General


Chief Complaint:  Skin Rash/Abscess


Source:  Patient





Present Illness


HPI





Disclaimer: Please note that this report is being documented using DRAGON 

technology. This can lead to erroneous entry secondary to incorrect 

interpretation by the dictating instrument.





HPI: This is a 49-year-old female who presents for evaluation of rash on the 

arm and chest.  Symptoms began approximately 3 days ago.  She does not recall 

any particular bug bite, exposure that she does note working in the garden 

recently.  Denies any pinprick by roses, cacti or other skin intrusion.  She 

complains of pain over the right elbow and over the mid chest where small 

pustules have appeared on erythematous base.  There is no weeping, no purulent 

drainage, no bleeding except when the patient scratches vigorously causing 

excoriations.  She denies any fevers, chills, vomiting, abdominal pain, 

diarrhea.  No similar rash previously.  No other members of the household have 

a similar rash.  Has not taken any medication yet.  History of chickenpox as a 

child


 


PMH: Denies


 


PSH: Denies


 


Allergies: Denies


 


Social Hx: Denies


Allergies:  


Coded Allergies:  


     No Known Allergies (Unverified , 9/14/16)





Patient History


Last Menstrual Period:  currently on it


Pregnant Now:  No





Nursing Documentation-PMH


Past Medical History:  No Stated History





Review of Systems


All Other Systems:  negative except mentioned in HPI





Physical Exam





Vital Signs








  Date Time  Temp Pulse Resp B/P (MAP) Pulse Ox O2 Delivery O2 Flow Rate FiO2


 


9/13/19 14:26 99.0 95 18 113/81 (92) 98 Room Air  





 





General: Awake and alert, no acute distress


HEENT: NC/AT. EOMI. 


Resp: Normal work of breathing. 


Skin: There are pustules on an erythematous base with mild surrounding erythema 

over the right elbow and over the ulnar aspect of the right forearm.  Similar 

pustules on erythematous base are seen over the sternum and the right side of 

the chest.  Nondermatomal distribution.  No vesicles.  No purulent drainage.  

Tender to palpation.  Not significantly warm to touch.  No urticaria, Nikolsky 

negative.


MSK: Normal tone and bulk. Moving all extremities.  No obvious deformity.


Neuro: Awake and alert.  Mentating appropriately.





Medical Decision Making


Diagnostic Impression:  


 Primary Impression:  


 Rash and other nonspecific skin eruption


ER Course


Is a 49-year-old female presenting for evaluation of rash on the chest and 

right arm for the past 3 days.  She describes it is painful though not itchy.  

The rash is pustular and erythematous base with multiple excoriations from the 

patient scratching.  May have a secondary cellulitis infection and may be 

either allergic or infectious process.  Little concern for zoster infection at 

this time as the distribution is nondermatomal, there are no vesicles and the 

rash is very scattered as opposed to linear.  Possible environmental exposure 

though concern for secondary cellulitis at this point we will start the patient 

on Keflex and prescribe hydrocortisone cream.  She is to follow-up with her PMD 

on Monday when the office is open and return to the emergency department sooner 

if the rash is spreading or she has any sudden worsening of her symptoms or 

develops any systemic signs of infection such as fever, vomiting, diarrhea.  We 

discussed these reasons to return to the emergency department as well as need 

for follow-up closely with her PMD.  She understands and agrees with the 

treatment plan was discharged home.





Last Vital Signs








  Date Time  Temp Pulse Resp B/P (MAP) Pulse Ox O2 Delivery O2 Flow Rate FiO2


 


9/13/19 14:26 99.0 95 18 113/81 (92) 98 Room Air  








Disposition:  HOME, SELF-CARE


Condition:  Stable


Scripts


Hydrocortisone 2% Cream (ANTI-ITCH 2% CREAM) Y Cr


28 GM TP TID for 5 Days, GM


   Prov: Luis Marcus MD         9/13/19 


Cephalexin* (CEPHALEXIN*) 500 Mg Tablet


500 MG ORAL EVERY 12 HOURS for 7 Days, #14 CAP


   Prov: Luis Marcus MD         9/13/19


Referrals:  


[facility]


Patient Instructions:  Rash





Additional Instructions:  


Please see your doctor Monday morning or the next available appointment for 

reevaluation.  If you see worsening of the rash, worsening pain, develop fevers 

or spreading of the rash please return to the emergency department for 

reevaluation.  Use the medicated cream as instructed as well as the antibiotics.











Luis Marcus MD Sep 13, 2019 14:54

## 2019-09-13 NOTE — NUR
ED Nurse Note:

pt c/o rashes located on her right elbow that is also on her right upper chest 
. ermd eval done awaiting orders.

## 2019-09-16 ENCOUNTER — HOSPITAL ENCOUNTER (EMERGENCY)
Dept: HOSPITAL 72 - EMR | Age: 49
Discharge: HOME | End: 2019-09-16
Payer: MEDICAID

## 2019-09-16 VITALS — SYSTOLIC BLOOD PRESSURE: 108 MMHG | DIASTOLIC BLOOD PRESSURE: 75 MMHG

## 2019-09-16 VITALS — DIASTOLIC BLOOD PRESSURE: 86 MMHG | SYSTOLIC BLOOD PRESSURE: 118 MMHG

## 2019-09-16 VITALS — WEIGHT: 240 LBS | BODY MASS INDEX: 40.97 KG/M2 | HEIGHT: 64 IN

## 2019-09-16 DIAGNOSIS — B02.9: Primary | ICD-10-CM

## 2019-09-16 DIAGNOSIS — H57.11: ICD-10-CM

## 2019-09-16 PROCEDURE — 99282 EMERGENCY DEPT VISIT SF MDM: CPT

## 2019-09-16 PROCEDURE — 90715 TDAP VACCINE 7 YRS/> IM: CPT

## 2019-09-16 PROCEDURE — 90471 IMMUNIZATION ADMIN: CPT

## 2019-09-16 NOTE — EMERGENCY ROOM REPORT
History of Present Illness


General


Chief Complaint:  Skin Rash/Abscess


Source:  Patient





Present Illness


HPI


She presents with a vesicular rash along her chest and down her right arm.  Is 

been developing over several days.  Her doctor prescribed to acyclovir but it 

has not started.  It began 4 days ago.  She had severe pain yesterday but less 

pain today.  Fevers.  In addition her right eyelid is bothering her.  There is 

no change in her vision.  She was seen in the emergency department 3 days ago.  

She was prescribed Keflex and hydrocortisone and it was felt that these were 

bug bites.  These medications have not helped.  She rates the pain 6/10 at this 

time.  Yesterday it was more severe.  Its of burning pain.





Received a cortisone shot for back pain 2 weeks ago.  Left-sided sciatica.





Of note she was seen November 2018 her right eye pain.  The diagnosis was 

unclear at that time.





No fevers, chills, sore throat, chest pain, palpitations, nausea, vomiting, 

diarrhea, dysuria, abdominal pain, shortness of breath, joint pain, depression, 

anxiety, headache.


Allergies:  


Coded Allergies:  


     No Known Allergies (Unverified , 9/14/16)





Patient History


Past Medical History:  see triage record, other - Sciatica


Social History:  Denies: smoking, alcohol use


Social History Narrative


With son


Last Menstrual Period:  09/10/2019


Reviewed Nursing Documentation:  PMH: Agreed; PSxH: Agreed





Nursing Documentation-PMH


Past Medical History:  No Stated History





Review of Systems


All Other Systems:  negative except mentioned in HPI





Physical Exam





Vital Signs








  Date Time  Temp Pulse Resp B/P (MAP) Pulse Ox O2 Delivery O2 Flow Rate FiO2


 


9/16/19 20:07 98.4 81 16 108/75 (86) 96 Room Air  








Sp02 EP Interpretation:  reviewed, normal


General Appearance:  well appearing, no apparent distress, GCS 15


Head:  normocephalic


Eyes:  right eye other - Tenderness without swelling right upper eyelid no 

injection; bilateral eye normal inspection, bilateral eye PERRL, bilateral eye 

EOMI


ENT:  moist mucus membranes


Neck:  full range of motion, supple


Respiratory:  chest non-tender, lungs clear


Cardiovascular #1:  regular rate, rhythm


Cardiovascular #2:  2+ radial (L)


Gastrointestinal:  normal inspection


Musculoskeletal:  digits/nails normal, gait/station normal, normal range of 

motion


Neurologic:  alert, grossly normal


Psychiatric:  mood/affect normal


Skin:  other - Vesicular rash right T1 dermatome





Medical Decision Making


Diagnostic Impression:  


 Primary Impression:  


 Shingles


 Qualified Codes:  B02.9 - Zoster without complications


 Additional Impression:  


 Right eyelid discomfort


ER Course


Patient presents with vesicular rash T1 dermatome right.  Examination is 

diagnostic for shingles.  In addition she has some right eye upper lid 

discomfort.  The exam is unremarkable.  Consideration of herpetic transfer to 

right eyelid.  Exam is against this.  Patient needs to be treated for shingles.

  Acyclovir is begun in the emergency department.  Ibuprofen is given.  In 

addition tetanus is updated.  It is possible that cortisone injection may have 

predisposed her to the shingles outbreak.





Discussed diagnosis with patient and son.





Patient stable for outpatient observation and treatment.  Close outpatient 

reevaluation advised.





Last Vital Signs








  Date Time  Temp Pulse Resp B/P (MAP) Pulse Ox O2 Delivery O2 Flow Rate FiO2


 


9/16/19 21:28 98.4 80 16 118/86 99 Room Air  








Status:  improved


Disposition:  HOME, SELF-CARE


Condition:  Improved


Scripts


Sulfacetamide Sodium (BLEPH-10) 5 Ml Drops


2 DROP OP TID, #5 ML


   Prov: Dav Hawkins MD         9/16/19 


Hydrocodone Bit/Acetaminophen 5-325* (NORCO 5-325*) 1 Each Tablet


1 TAB ORAL Q6H PRN for For Pain, #10 TAB 0 Refills


   Prov: Dav Hawkins MD         9/16/19 


Capsaicin (Zostrix High Potency) 56.6 Gm Cream..g.


1 APPLIC TP DAILY, #60 GM


   Prov: Dav Hawkins MD         9/16/19 


Valacyclovir Hcl* (VALTREX*) 500 Mg Tablet


1000 MG ORAL TID, #20 TAB


   Prov: Dav Hawkins MD         9/16/19











Dav Hawkins MD Sep 16, 2019 21:07

## 2019-09-16 NOTE — NUR
ED Nurse Note:







pt walked in c/o skin rash on right elbow area and pain in her right eye, pt 
reports she was in the ED last friday and was prescribed medication, when she 
went to go see the pcp, pcp told her that she has shingles. no redness nor 
swelling nor discharge noted in right eye. noted small round skin bumps on 
right elbow, will cont monitor. no sx infection noted.

## 2020-02-21 ENCOUNTER — HOSPITAL ENCOUNTER (EMERGENCY)
Dept: HOSPITAL 72 - EMR | Age: 50
Discharge: HOME | End: 2020-02-21
Payer: COMMERCIAL

## 2020-02-21 VITALS — DIASTOLIC BLOOD PRESSURE: 78 MMHG | SYSTOLIC BLOOD PRESSURE: 117 MMHG

## 2020-02-21 VITALS — WEIGHT: 160 LBS | HEIGHT: 64 IN | BODY MASS INDEX: 27.31 KG/M2

## 2020-02-21 VITALS — SYSTOLIC BLOOD PRESSURE: 116 MMHG | DIASTOLIC BLOOD PRESSURE: 85 MMHG

## 2020-02-21 DIAGNOSIS — B34.9: Primary | ICD-10-CM

## 2020-02-21 PROCEDURE — 99283 EMERGENCY DEPT VISIT LOW MDM: CPT

## 2020-02-21 PROCEDURE — 71045 X-RAY EXAM CHEST 1 VIEW: CPT

## 2020-02-21 NOTE — DIAGNOSTIC IMAGING REPORT
EXAM:

  XR Chest, 1 View

 

CLINICAL HISTORY:

  COUGH

 

TECHNIQUE:

  Frontal view of the chest.

 

COMPARISON:

  None.

 

FINDINGS:

  Lungs:  The lungs are well aerated.  Possible minimal subsegmental 

atelectasis versus early pneumonia at the left lung base.

  Pleural space:  No pleural effusions.  No pneumothorax.

  Heart:  There is cardiomegaly.

  Mediastinum:  Unremarkable.

  Bones/joints:  Osteopenia.

 

IMPRESSION:     

  Possible subsegmental atelectasis versus early pneumonia at the left 

lung base.  Clinical correlation is advised.  Mild cardiomegaly.

## 2020-02-21 NOTE — EMERGENCY ROOM REPORT
History of Present Illness


General


Chief Complaint:  Upper Respiratory Illness


Source:  Patient





Present Illness


HPI


Disclaimer: Please note that this report is being documented using DRAGON 

technology. This can lead to erroneous entry secondary to incorrect 

interpretation by the dictating instrument.





HPI: 49-year-old female presents for evaluation of cough and headache.  She 

states her symptoms again today.  She has had a sore throat for the past 4 days 

and was using NyQuil.  She stopped taking it yesterday and this morning woke up 

with a nonproductive cough.  Denies hemoptysis but states she is coughing so 

hard she is now getting a headache.  Denies any chest pain, palpitations, 

abdominal pain, nausea, vomiting, diarrhea, fever, chills, rash.  Using over-the

-counter medications for cough but is not helping.  Patient is non-smoker and 

denies any lung disease.  Does not use any inhalers.  Headache is currently an 8

/10.  Denies any visual changes, numbness, tingling, weakness, changes in 

concentration or memory or changes in balance or coordination.


 


PMH: Denies


 


PSH:  section


 


Allergies: Denies


 


Social Hx: Non-smoker


Allergies:  


Coded Allergies:  


     No Known Allergies (Unverified , 16)





Nursing Documentation-PMH


Past Medical History:  No Stated History





Review of Systems


All Other Systems:  negative except mentioned in HPI





Physical Exam





Vital Signs








  Date Time  Temp Pulse Resp B/P (MAP) Pulse Ox O2 Delivery O2 Flow Rate FiO2


 


20 22:44 98.4 86 16 117/78 (91) 97 Room Air  





 





General: Awake and alert, no acute distress


HEENT: NC/AT. EOMI. 


Cardiovascular: RRR.  S1 and S2 normal.  No murmur appreciated


Resp: Normal work of breathing.  Persistent cough throughout the exam.  No 

wheezes or crackles.


Skin: Intact.  No abrasions, laceration or rash over the exposed skin


MSK: Normal tone and bulk. Moving all extremities.  No obvious deformity.


Neuro: Awake and alert.  Mentating appropriately.





Medical Decision Making


Diagnostic Impression:  


 Primary Impression:  


 Viral respiratory infection


ER Course


49-year-old female presents for evaluation of cough and headache.  Recent URI 

symptoms likely a viral syndrome but will obtain x-ray to rule out pneumonia.  

Patient was given cough medications and Tylenol in the ED.  Do not believe she 

requires emergent labs at this time.


Chest X-Ray Diagnostic Results


Chest X-Ray Diagnostic Results :  


   Chest X-Ray Ordered:  Yes


   # of Views/Limited/Complete:  1 View


   Indication:  Shortness of Breath


   EP Interpretation:  Yes


   Interpretation:  no consolidation, no effusion, no pneumothorax, no acute 

cardiopulmonary disease


   Impression:  No acute disease


   Electronically Signed by:  Electronically signed by Dr. Luis Marcus


Reevaluation Time:  23:42





Last Vital Signs








  Date Time  Temp Pulse Resp B/P (MAP) Pulse Ox O2 Delivery O2 Flow Rate FiO2


 


20 22:44 98.4 86 16 117/78 (91) 97 Room Air  








Reevaluation Impression


No obvious infiltrate.  Patient feeling better after receiving medications.  

Will discharge home with symptomatic care and PMD follow-up.  Instructed to 

return to the emergency department new or worsening symptoms.  Patient and 

family understand and agree with this treatment plan.


Disposition:  HOME, SELF-CARE


Condition:  Stable


Scripts


Codeine/Promethazine Hcl* (PROMETHAZINE-CODEINE SYRUP*) 118 Ml Syrup


5 ML ORAL Q6H PRN for For Cough for 5 Days, #118 ML 0 Refills


   Prov: Luis Marcus MD         20











Luis Marcus MD 2020 23:06

## 2020-02-21 NOTE — NUR
ED Nurse Note:



Pt walked into ED from home for c/o cough. Pt denies any pain, cp or SOB. Pt is 
aaox4, no cardiac or respiratory distress noted.

## 2020-12-29 ENCOUNTER — HOSPITAL ENCOUNTER (EMERGENCY)
Dept: HOSPITAL 72 - EMR | Age: 50
Discharge: HOME | End: 2020-12-29
Payer: COMMERCIAL

## 2020-12-29 VITALS — BODY MASS INDEX: 40.97 KG/M2 | HEIGHT: 64 IN | WEIGHT: 240 LBS

## 2020-12-29 VITALS — DIASTOLIC BLOOD PRESSURE: 80 MMHG | SYSTOLIC BLOOD PRESSURE: 119 MMHG
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
## 2020-12-29 DIAGNOSIS — T18.128A: Primary | ICD-10-CM
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
## 2020-12-29 PROCEDURE — 96372 THER/PROPH/DIAG INJ SC/IM: CPT

## 2020-12-29 PROCEDURE — 71250 CT THORAX DX C-: CPT

## 2020-12-29 PROCEDURE — 99284 EMERGENCY DEPT VISIT MOD MDM: CPT

## 2020-12-29 NOTE — EMERGENCY ROOM REPORT
History of Present Illness


General


Chief Complaint:  Upper Respiratory Illness


Source:  Patient





Present Illness


HPI


50-year-old female presents with food impaction patient reports swallowing some 

food/bread 2 days ago and it stuck at the sternal notch area no aggravating 

leaving factors severity is moderate, constant patient has attempted soda


Allergies:  


Coded Allergies:  


     No Known Allergies (Unverified , 9/14/16)





COVID-19 Screening


Contact w/high risk pt:  No


Experienced COVID-19 symptoms?:  No


COVID-19 Testing performed PTA:  No





Patient History


Past Medical History:  see triage record


Reviewed Nursing Documentation:  PMH: Agreed; PSxH: Agreed





Nursing Documentation-PMH


Past Medical History:  No Stated History





Review of Systems


All Other Systems:  negative except mentioned in HPI





Physical Exam





Vital Signs








  Date Time  Temp Pulse Resp B/P (MAP) Pulse Ox O2 Delivery O2 Flow Rate FiO2


 


12/29/20 13:26 97.7 75 16 119/80 (93) 94 Room Air  








Sp02 EP Interpretation:  reviewed, normal


General Appearance:  well appearing, no apparent distress, alert


Head:  normocephalic, atraumatic


Eyes:  bilateral eye PERRL, bilateral eye EOMI


ENT:  uvula midline, moist mucus membranes


Neck:  supple, thyroid normal, supple/symm/no masses


Respiratory:  lungs clear, no respiratory distress, no retraction, no accessory 

muscle use


Cardiovascular #1:  normal peripheral pulses, regular rate, rhythm, no edema, no

gallop, no murmur


Gastrointestinal:  non tender, soft, no guarding, no rebound


Musculoskeletal:  normal inspection


Neurologic:  alert, oriented x3


Psychiatric:  mood/affect normal


Skin:  no rash, warm/dry





Medical Decision Making


Diagnostic Impression:  


   Primary Impression:  


   Food impaction of esophagus


   Qualified Codes:  T18.128A - Food in esophagus causing other injury, initial 

   encounter


ER Course


50 old female presents with foreign body sensation in the esophagus


Patient was symptomatically treated with soda, glucagon


Patient improved CT scan to evaluate for foreign body, CT scan was negative


Patient is tolerating secretions no indications for emergent endoscopy patient 

counseled to follow-up with gastroenterology for outpatient endoscope for her 

repeated dysphagia


Disposition home with return precautions follow-up with PCP


CT/MRI/US Diagnostic Results


CT/MRI/US Diagnostic Results :  


   Impression


Procedure: CT Chest no Contrast





EXAM:


  CT Chest Without Intravenous Contrast


 


CLINICAL HISTORY:


  PAIN


 


TECHNIQUE:


  Axial computed tomography images of the chest without intravenous 


contrast.  CTDI is 13.7 mGy and DLP is 641.2 mGy-cm.  One or more of the 


following dose reduction techniques were used: automated exposure control,


 adjustment of the mA and/or kV according to patient size, use of 


iterative reconstruction technique.


 


COMPARISON:


  No relevant prior studies available.


 


FINDINGS:


  Lungs:  Mosaic lung attenuation could represent small airways disease.


  Pleural space:  Unremarkable.  No pneumothorax.  No significant 


effusion.


  Heart:  Unremarkable.  No cardiomegaly.  No significant pericardial 


effusion.


  Bones/joints:  Unremarkable.  No acute fracture.  No dislocation.


  Soft tissues:  Unremarkable.


  Vasculature:  Unremarkable.  No thoracic aortic aneurysm.


  Lymph nodes:  Unremarkable.  No enlarged lymph nodes.


 


IMPRESSION:     


1.  No acute abnormality definitively identified to account for patient 


presentation.


2.  Mosaic lung attenuation could represent small airways disease.


3.  Otherwise unremarkable study.


 














Dictated By:    Devin Kenny MD                                


Electronically Signed By:Devin Kenny MD                                


Signed Date/Time12/29/20 1929                                   








CC: Edwin Monet MD





Last Vital Signs








  Date Time  Temp Pulse Resp B/P (MAP) Pulse Ox O2 Delivery O2 Flow Rate FiO2


 


12/29/20 13:44  75 16   Room Air  


 


12/29/20 13:44 97.7   119/80 94   








Disposition:  HOME, SELF-CARE


Condition:  Stable


Referrals:  


WILBER BENDER,REFERRING (PCP)











Juwan Navarro MD


Patient Instructions:  Dysphagia, Dysphagia Diet Level 1, Pureed





Additional Instructions:  


The patient was provided with discharge instructions, notified to follow-up with

a primary care doctor and or specialist in the next 24-48 hours, and to return 

to the ED if they have worsening of their symptoms. 





Please note that this report is being documented using DRAGON technology.


This can lead to erroneous entry secondary to incorrect interpretation by the 

dictating instrument. 





PLEASE FOLLOW-UP AS AN OUTPATIENT FOR ENDOSCOPY











Edwin Monet MD          Dec 29, 2020 15:13

## 2020-12-29 NOTE — NUR
ED Nurse Note:



Pt ambulated to ED from home stating that bread may have got stucked on her 
throat for 3 days. Pt states its making her feel like having trouble breathing. 
pt saturation at 94% RA. Pt is AOx4, calm and cooperative to care, VSS, 
afebrileon triage.

## 2020-12-29 NOTE — DIAGNOSTIC IMAGING REPORT
EXAM:

  CT Chest Without Intravenous Contrast

 

CLINICAL HISTORY:

  PAIN

 

TECHNIQUE:

  Axial computed tomography images of the chest without intravenous 

contrast.  CTDI is 13.7 mGy and DLP is 641.2 mGy-cm.  One or more of the 

following dose reduction techniques were used: automated exposure control,

 adjustment of the mA and/or kV according to patient size, use of 

iterative reconstruction technique.

 

COMPARISON:

  No relevant prior studies available.

 

FINDINGS:

  Lungs:  Mosaic lung attenuation could represent small airways disease.

  Pleural space:  Unremarkable.  No pneumothorax.  No significant 

effusion.

  Heart:  Unremarkable.  No cardiomegaly.  No significant pericardial 

effusion.

  Bones/joints:  Unremarkable.  No acute fracture.  No dislocation.

  Soft tissues:  Unremarkable.

  Vasculature:  Unremarkable.  No thoracic aortic aneurysm.

  Lymph nodes:  Unremarkable.  No enlarged lymph nodes.

 

IMPRESSION:     

1.  No acute abnormality definitively identified to account for patient 

presentation.

2.  Mosaic lung attenuation could represent small airways disease.

3.  Otherwise unremarkable study.
